# Patient Record
Sex: MALE | Race: WHITE | Employment: STUDENT | URBAN - METROPOLITAN AREA
[De-identification: names, ages, dates, MRNs, and addresses within clinical notes are randomized per-mention and may not be internally consistent; named-entity substitution may affect disease eponyms.]

---
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
## 2020-02-22 ENCOUNTER — HOSPITAL ENCOUNTER (EMERGENCY)
Age: 20
Discharge: HOME | End: 2020-02-22
Payer: COMMERCIAL

## 2020-02-22 ENCOUNTER — APPOINTMENT (OUTPATIENT)
Dept: GENERAL RADIOLOGY | Age: 20
End: 2020-02-22
Payer: COMMERCIAL

## 2020-02-22 VITALS
WEIGHT: 215 LBS | SYSTOLIC BLOOD PRESSURE: 148 MMHG | HEART RATE: 55 BPM | OXYGEN SATURATION: 99 % | HEIGHT: 75 IN | DIASTOLIC BLOOD PRESSURE: 69 MMHG | TEMPERATURE: 97.3 F | RESPIRATION RATE: 16 BRPM | BODY MASS INDEX: 26.73 KG/M2

## 2020-02-22 PROCEDURE — 96375 TX/PRO/DX INJ NEW DRUG ADDON: CPT

## 2020-02-22 PROCEDURE — 2500000003 HC RX 250 WO HCPCS: Performed by: NURSE PRACTITIONER

## 2020-02-22 PROCEDURE — 2580000003 HC RX 258: Performed by: NURSE PRACTITIONER

## 2020-02-22 PROCEDURE — 6360000002 HC RX W HCPCS: Performed by: NURSE PRACTITIONER

## 2020-02-22 PROCEDURE — 71046 X-RAY EXAM CHEST 2 VIEWS: CPT

## 2020-02-22 PROCEDURE — 99283 EMERGENCY DEPT VISIT LOW MDM: CPT

## 2020-02-22 PROCEDURE — 96374 THER/PROPH/DIAG INJ IV PUSH: CPT

## 2020-02-22 RX ORDER — FAMOTIDINE 20 MG/1
20 TABLET, FILM COATED ORAL DAILY
Qty: 5 TABLET | Refills: 0 | Status: SHIPPED | OUTPATIENT
Start: 2020-02-22

## 2020-02-22 RX ORDER — METHYLPREDNISOLONE SODIUM SUCCINATE 125 MG/2ML
125 INJECTION, POWDER, LYOPHILIZED, FOR SOLUTION INTRAMUSCULAR; INTRAVENOUS ONCE
Status: COMPLETED | OUTPATIENT
Start: 2020-02-22 | End: 2020-02-22

## 2020-02-22 RX ORDER — SODIUM CHLORIDE 9 MG/ML
1000 INJECTION, SOLUTION INTRAVENOUS CONTINUOUS
Status: DISCONTINUED | OUTPATIENT
Start: 2020-02-22 | End: 2020-02-22 | Stop reason: HOSPADM

## 2020-02-22 RX ORDER — PREDNISONE 10 MG/1
TABLET ORAL
Qty: 20 TABLET | Refills: 0 | Status: SHIPPED | OUTPATIENT
Start: 2020-02-22 | End: 2020-03-03

## 2020-02-22 RX ADMIN — SODIUM CHLORIDE 1000 ML: 9 INJECTION, SOLUTION INTRAVENOUS at 00:19

## 2020-02-22 RX ADMIN — FAMOTIDINE 40 MG: 10 INJECTION, SOLUTION INTRAVENOUS at 00:19

## 2020-02-22 RX ADMIN — METHYLPREDNISOLONE SODIUM SUCCINATE 125 MG: 125 INJECTION, POWDER, FOR SOLUTION INTRAMUSCULAR; INTRAVENOUS at 00:19

## 2020-02-23 NOTE — ED PROVIDER NOTES
[address] [place] [address]  ED  EMERGENCY DEPARTMENT ENCOUNTER      This patient was not seen and evaluated by the attending physician. Pt Name: Saeed Ragland  MRN: [id number]  [place] 2000  Date of evaluation: 2/22/2020  Provider: CONSUELO Crespo  PCP: Referring Not In System (Inactive)      History provided by the patient. CHIEFCOMPLAINT:     Chief Complaint   Patient presents with    Allergic Reaction     started with hives at approx 2230, took benadryl 2 at approx 2330. redness and hives worse. states feels little sob       HISTORY OF PRESENT ILLNESS:      Saeed Ragland is a 20 y.o. male who presents to [address] [place] [address]  ED with complaints of allergic reaction. Patient states that he started to have hives about 1030, states that he took 2 Benadryl, he states that initially he had vomited once when the hives started. He states that he did feel like he was having difficulty breathing. Patient unsure of any new ingestion. He has had no new medications. He states that he did eat some strawberries and raspberries which she does not normally do however that was much earlier in the day. Patient currently in no obvious respiratory distress but he does have urticarial rash noted to chest and back. He is here for further evaluation      LOCATION:-  QUALITY:-  SEVERITY:-  DURATION:-  MODIFYING FACTORS:-    Nursing Notes were reviewed     REVIEW OF SYSTEMS:     Review of Systems  All systems, a total of 10, are reviewed and negative except for those that were just noted in history present illness. PAST MEDICAL HISTORY:     Past Medical History:   Diagnosis Date    Fractures wrist         SURGICAL HISTORY:      Past Surgical History:   Procedure Laterality Date    WRIST SURGERY      right         CURRENT MEDICATIONS:       Discharge Medication List as of 2/22/2020  1:34 AM            ALLERGIES:    Patient has no known allergies.     FAMILY HISTORY: History reviewed. No pertinent family history. SOCIAL HISTORY:     Social History     Socioeconomic History    Marital status: Single     Spouse name: None    Number of children: None    Years of education: None    Highest education level: None   Occupational History    None   Social Needs    Financial resource strain: None    Food insecurity:     Worry: None     Inability: None    Transportation needs:     Medical: None     Non-medical: None   Tobacco Use    Smoking status: Current Every Day Smoker     Types: E-Cigarettes    Smokeless tobacco: Never Used   Substance and Sexual Activity    Alcohol use: Yes     Comment: monthly    Drug use: Yes     Types: Marijuana     Comment: occas    Sexual activity: None   Lifestyle    Physical activity:     Days per week: None     Minutes per session: None    Stress: None   Relationships    Social connections:     Talks on phone: None     Gets together: None     Attends [employer] service: None     Active member of club or organization: None     Attends meetings of clubs or organizations: None     Relationship status: None    Intimate partner violence:     Fear of current or ex partner: None     Emotionally abused: None     Physically abused: None     Forced sexual activity: None   Other Topics Concern    None   Social History Narrative    None       SCREENINGS:             PHYSICAL EXAM:       ED Triage Vitals [02/22/20 0014]   BP Temp Temp src Pulse Resp SpO2 Height Weight   (!) 157/87 97.3 °F (36.3 °C) -- 65 16 98 % 6' 3\" (1.905 m) 215 lb (97.5 kg)       Physical Exam    CONSTITUTIONAL: Awake and alert. Cooperative. Well-developed. Well-nourished. Non-toxic. Vitals:    02/22/20 0014 02/22/20 0145   BP: (!) 157/87 (!) 148/69   Pulse: 65 55   Resp: 16 16   Temp: 97.3 °F (36.3 °C)    SpO2: 98% 99%   Weight: 215 lb (97.5 kg)    Height: 6' 3\" (1.905 m)      HENT: Normocephalic. Atraumatic. External ears normal, without discharge. TMs clear bilaterally. methylPREDNISolone sodium (SOLU-MEDROL) injection 125 mg (125 mg Intravenous Given 2/22/20 0019)   famotidine (PEPCID) injection 40 mg (40 mg Intravenous Given 2/22/20 0019)         Patient was evaluated independently by myself with the attending physician available for consultation. Patient presented to the emergency room today with complaints of an allergic reaction. Physical exam patient did have an urticarial rash consistent with probable allergic reaction, he had already taken Benadryl prior to arrival in the ED so he was supplemented with Solu-Medrol and Pepcid. Patient chest x-ray showed no focal pneumonia. Patient physical assessment was unremarkable I did not feel the epinephrine was necessary. He was monitored here in the ED after administration of medication he had significant relief of symptoms. Patient reported feeling much better and his rash had subsided. He was discharged home on steroids and Pepcid, instructed to follow-up with primary care physician or return to the ED for any worsening symptoms. Patient verbalized understanding this plan. He was discharged home in good condition. Patient laboratory studies, radiographic imaging, and assessment were all discussed with the patient and/orpatient family. There was shared decision-making between myself as well as the patient and/or their surrogate and we are all in agreement with discharge home. There was an opportunity for questions and all questions were answered tothe best of my ability and to the satisfaction of the patient and/or patient family. MDM  I considered allergic reaction, anaphylaxis, contact dermatitis, asthma exacerbation       FINAL IMPRESSION:      1.  Allergic reaction, initial encounter          DISPOSITION/PLAN:   DISPOSITION Decision To Discharge      PATIENT REFERRED TO:  [facility]  [address]  Go to   If symptoms worsen      DISCHARGE